# Patient Record
Sex: MALE | Race: WHITE | NOT HISPANIC OR LATINO | Employment: OTHER | URBAN - METROPOLITAN AREA
[De-identification: names, ages, dates, MRNs, and addresses within clinical notes are randomized per-mention and may not be internally consistent; named-entity substitution may affect disease eponyms.]

---

## 2023-11-16 ENCOUNTER — MEDICAL CORRESPONDENCE (OUTPATIENT)
Dept: HEALTH INFORMATION MANAGEMENT | Facility: CLINIC | Age: 58
End: 2023-11-16

## 2023-11-16 ENCOUNTER — TRANSFERRED RECORDS (OUTPATIENT)
Dept: HEALTH INFORMATION MANAGEMENT | Facility: CLINIC | Age: 58
End: 2023-11-16

## 2023-11-27 ENCOUNTER — MEDICAL CORRESPONDENCE (OUTPATIENT)
Dept: HEALTH INFORMATION MANAGEMENT | Facility: CLINIC | Age: 58
End: 2023-11-27

## 2023-11-27 ENCOUNTER — TRANSCRIBE ORDERS (OUTPATIENT)
Dept: OTHER | Age: 58
End: 2023-11-27

## 2023-11-27 DIAGNOSIS — H46.9 OPTIC NEUROPATHY, LEFT: Primary | ICD-10-CM

## 2023-12-21 DIAGNOSIS — H47.012 ISCHEMIC OPTIC NEUROPATHY OF LEFT EYE: Primary | ICD-10-CM

## 2023-12-22 ENCOUNTER — OFFICE VISIT (OUTPATIENT)
Dept: OPHTHALMOLOGY | Facility: CLINIC | Age: 58
End: 2023-12-22
Attending: STUDENT IN AN ORGANIZED HEALTH CARE EDUCATION/TRAINING PROGRAM
Payer: COMMERCIAL

## 2023-12-22 ENCOUNTER — LAB (OUTPATIENT)
Dept: LAB | Facility: CLINIC | Age: 58
End: 2023-12-22
Payer: COMMERCIAL

## 2023-12-22 DIAGNOSIS — H46.9 OPTIC NEUROPATHY, LEFT: Primary | ICD-10-CM

## 2023-12-22 DIAGNOSIS — H47.012 ISCHEMIC OPTIC NEUROPATHY OF LEFT EYE: ICD-10-CM

## 2023-12-22 DIAGNOSIS — H46.9 OPTIC NEUROPATHY, LEFT: ICD-10-CM

## 2023-12-22 PROCEDURE — 36415 COLL VENOUS BLD VENIPUNCTURE: CPT | Performed by: PATHOLOGY

## 2023-12-22 PROCEDURE — 86363 MOG-IGG1 ANTB FLO CYTMTRY EA: CPT | Mod: 90 | Performed by: PATHOLOGY

## 2023-12-22 PROCEDURE — 86038 ANTINUCLEAR ANTIBODIES: CPT | Performed by: STUDENT IN AN ORGANIZED HEALTH CARE EDUCATION/TRAINING PROGRAM

## 2023-12-22 PROCEDURE — 99205 OFFICE O/P NEW HI 60 MIN: CPT | Performed by: STUDENT IN AN ORGANIZED HEALTH CARE EDUCATION/TRAINING PROGRAM

## 2023-12-22 PROCEDURE — 86481 TB AG RESPONSE T-CELL SUSP: CPT | Performed by: STUDENT IN AN ORGANIZED HEALTH CARE EDUCATION/TRAINING PROGRAM

## 2023-12-22 PROCEDURE — 92083 EXTENDED VISUAL FIELD XM: CPT | Performed by: STUDENT IN AN ORGANIZED HEALTH CARE EDUCATION/TRAINING PROGRAM

## 2023-12-22 PROCEDURE — G0463 HOSPITAL OUTPT CLINIC VISIT: HCPCS | Performed by: STUDENT IN AN ORGANIZED HEALTH CARE EDUCATION/TRAINING PROGRAM

## 2023-12-22 PROCEDURE — 92133 CPTRZD OPH DX IMG PST SGM ON: CPT | Performed by: STUDENT IN AN ORGANIZED HEALTH CARE EDUCATION/TRAINING PROGRAM

## 2023-12-22 PROCEDURE — 86618 LYME DISEASE ANTIBODY: CPT | Performed by: STUDENT IN AN ORGANIZED HEALTH CARE EDUCATION/TRAINING PROGRAM

## 2023-12-22 PROCEDURE — 86037 ANCA TITER EACH ANTIBODY: CPT | Performed by: STUDENT IN AN ORGANIZED HEALTH CARE EDUCATION/TRAINING PROGRAM

## 2023-12-22 PROCEDURE — 92015 DETERMINE REFRACTIVE STATE: CPT

## 2023-12-22 PROCEDURE — 99000 SPECIMEN HANDLING OFFICE-LAB: CPT | Performed by: PATHOLOGY

## 2023-12-22 PROCEDURE — 86036 ANCA SCREEN EACH ANTIBODY: CPT | Performed by: STUDENT IN AN ORGANIZED HEALTH CARE EDUCATION/TRAINING PROGRAM

## 2023-12-22 PROCEDURE — 82164 ANGIOTENSIN I ENZYME TEST: CPT | Mod: 90 | Performed by: PATHOLOGY

## 2023-12-22 PROCEDURE — 86780 TREPONEMA PALLIDUM: CPT | Performed by: STUDENT IN AN ORGANIZED HEALTH CARE EDUCATION/TRAINING PROGRAM

## 2023-12-22 PROCEDURE — 86053 AQAPRN-4 ANTB FLO CYTMTRY EA: CPT | Mod: 90 | Performed by: PATHOLOGY

## 2023-12-22 RX ORDER — ROSUVASTATIN CALCIUM 10 MG/1
1 TABLET, COATED ORAL DAILY
COMMUNITY
Start: 2023-11-14

## 2023-12-22 RX ORDER — LOSARTAN POTASSIUM 25 MG/1
1 TABLET ORAL DAILY
COMMUNITY
Start: 2023-11-10

## 2023-12-22 ASSESSMENT — CONF VISUAL FIELD
OD_INFERIOR_TEMPORAL_RESTRICTION: 0
OD_SUPERIOR_NASAL_RESTRICTION: 0
OS_INFERIOR_NASAL_RESTRICTION: 0
METHOD: COUNTING FINGERS
OD_NORMAL: 1
OS_SUPERIOR_NASAL_RESTRICTION: 0
OS_INFERIOR_TEMPORAL_RESTRICTION: 0
OD_SUPERIOR_TEMPORAL_RESTRICTION: 0
OD_INFERIOR_NASAL_RESTRICTION: 0
OS_NORMAL: 1
OS_SUPERIOR_TEMPORAL_RESTRICTION: 0

## 2023-12-22 ASSESSMENT — REFRACTION_WEARINGRX
OD_CYLINDER: SPHERE
SPECS_TYPE: OTC READERS
OS_CYLINDER: SPHERE
OS_SPHERE: +3.50
OD_SPHERE: +3.50

## 2023-12-22 ASSESSMENT — TONOMETRY
IOP_METHOD: ICARE
OS_IOP_MMHG: 18
OD_IOP_MMHG: 19

## 2023-12-22 ASSESSMENT — SLIT LAMP EXAM - LIDS
COMMENTS: NORMAL
COMMENTS: NORMAL

## 2023-12-22 ASSESSMENT — CUP TO DISC RATIO
OS_RATIO: 0.5
OD_RATIO: 0.34

## 2023-12-22 ASSESSMENT — REFRACTION_MANIFEST
OS_CYLINDER: SPHERE
OD_ADD: +2.50
OD_SPHERE: +2.75
OS_ADD: +2.50
OS_SPHERE: +2.00
OD_CYLINDER: SPHERE

## 2023-12-22 ASSESSMENT — VISUAL ACUITY
METHOD: SNELLEN - LINEAR
OS_PH_CC+: +1
CORRECTION_TYPE: GLASSES
OD_CC: 20/25
OS_PH_CC: 20/60
OS_CC: 20/150
METHOD_MR: PT REQUESTING REFRACTION

## 2023-12-22 ASSESSMENT — EXTERNAL EXAM - RIGHT EYE: OD_EXAM: NORMAL

## 2023-12-22 ASSESSMENT — EXTERNAL EXAM - LEFT EYE: OS_EXAM: NORMAL

## 2023-12-22 NOTE — LETTER
2023       RE: Valdo Guadalupe  [facility] [address]     Dear Colleague,    Thank you for referring your patient, Valdo Guadalupe, to the [facility] [place] at [facility]. Please see a copy of my visit note below.    Neuro-Ophthalmology New Patient Visit  Dec 22, 2023         Patient was sent for consultation by Dr. Hatfield for optic neuropathy OS     HPI:    Patient noticed sudden onset left eye blurry vision on 10/26/23. He denies any eye pain, double vision, headaches, jaw claudication, fevers/night sweats, unintentional weight loss, extremity weakness. Patient went to ER on 10/28/23 after the start of the episode due to concern for stroke or brain tumor. Patient had CT head, MRA head/neck, MRI Brain came back normal. Patient then had saw Dr. Morton at [facility] then sent patient back to ER for further testing on 2023. He had ESR and CRP obtained which came back negative. He then was referred to Dr. Hatfield who performed FA which showed no slowed choroidal perfusion and swollen optic nerve OS. There was a noted white spot near the optic nerve OS. Patient was then referred here for further evaluation. Per patient after the initial 2-3 days after symptoms started it hasn't gotten any worse and actually has noticed blurry vision has improved.     Patient has HTN, HLD, borderline DMT2    Review of outside clinical notes/testin/16/23: Visit with Dr. Hatfield    10/28/23: MRI Brain WO:  IMPRESSION:   1. No acute infarction, mass effect, or intracranial hemorrhage.   2. Minimal chronic microvascular ischemic changes.     10/28/23: MRA Head/Neck:  Impression:  Unremarkable MRA of the head and neck.                 My interpretation performed today of outside testing:  Will attempt to obtain images of above MRI scans.     Past medical history:    Patient Active Problem List   Diagnosis     CARDIOVASCULAR SCREENING;  LDL GOAL LESS THAN 160       Medications:   Patient has a current medication list which includes the following prescription(s): losartan and rosuvastatin.     Family history / social history:  Patient's family history includes Diabetes in his father; Family History Negative in his brother, mother, and sister; Psychotic Disorder in his brother.    Patient  reports that he has been smoking. He has been smoking an average of 0.5 packs per day. He does not have any smokeless tobacco history on file. He reports current alcohol use. He reports that he does not use drugs.    Exam:    Base Eye Exam       Visual Acuity (Snellen - Linear)         Right Left    Dist cc 20/25 20/150    Dist ph cc  20/60 +1      Correction: Glasses              Tonometry ([facility], 2:35 PM)         Right Left    Pressure 19 18              Pupils         APD    Right None    Left Yes              Visual Fields (Counting fingers)         Left Right     Full Full              Extraocular Movement         Right Left     Full Full              Neuro/Psych       Oriented x3: Yes    Mood/Affect: Normal              Dilation       Both eyes: 1.0% Mydriacyl, 2.5% Rock Synephrine @ 3:33 PM                  Additional Tests       Color         Right Left    Ishihara 11/11 0/11                  Slit Lamp and Fundus Exam       External Exam         Right Left    External Normal Normal              Slit Lamp Exam         Right Left    Lids/Lashes Normal Normal    Conjunctiva/Sclera White and quiet White and quiet    Cornea Clear Clear    Anterior Chamber Deep and quiet Deep and quiet    Iris Round and reactive Round and reactive    Lens Clear Clear    Anterior Vitreous Normal Normal              Fundus Exam         Right Left    Disc Normal temporal pallor    C/D Ratio 0.34 0.5    Macula Normal Normal    Vessels Normal tortuous    Periphery Normal Normal                  Refraction       Wearing Rx         Sphere Cylinder    Right +3.50 Sphere    Left +3.50  Sphere      Type: OTC readers              Manifest Refraction         Sphere Cylinder Dist VA Add Near VA    Right +2.75 Sphere 20/20 +2.50 20/20    Left +2.00 Sphere 20/60-1 +2.50 20/30   Pt requesting refraction             Final Rx         Sphere Cylinder Dist VA Add Near VA    Right +2.75 Sphere 20/20 +2.50 20/20    Left +2.00 Sphere 20/60-1 +2.50 20/30      Expiration Date: 12/22/23                     Tests ordered and interpreted today:     Low Vision Central OU          Performed by: Jo   . Patient cooperation: Reliable   .   Good fix. Not dilated.     Right Eye  Reliability of the test: Good   . Findings: Nonspecific defect   . Interpretation: Normal   . Plan: Monitor   . Interval: Initial   .     Left Eye  Reliability of the test: Good   . Findings: Central scotoma, Bitemporal defect   . Interpretation: Abnormal, Optic neuropathy   . Plan: Monitor   . Interval: Initial   .          OCT Optic Nerve RNFL Spectralis OU (both eyes)          Performed by: KATIE   .     Right Eye  Reliability of the test: Good   . Interpretation: Normal   . Plan: Monitor   . Interval: Initial   .     Left Eye  Reliability of the test: Good   . Interpretation: Temporal loss   . Plan: Monitor   . Interval: Initial   .              Independent historians:  Patient     Discussion of management/interpretation with another provider:   None   Assessment:    Valdo Guadalupe is a pleasant 58 year old male with the following diagnoses:     Optic neuropathy OS     Patient with sudden onset painless blurry vision OS on 10/26/23 without disc edema, normal MRI Brain WO and normal MRA Head/Neck, normal ESR/CRP and no signs of delayed choroidal perfusion on FA obtained by Dr. Hatfield. Today on exam there is severe decrease of color vision  and temporal optic nerve pallor OS only with RNFL thinning, diffuse severe GCL loss and central/temporal field defect. Unable to review previously obtained MRI images today they were also obtained without  contrast. Patient states vision is subjectively improving since initial vision loss but still has significant decrease in visual acuity. I will attempt to obtain an MRI Orbit WWO contrast to rule out any inflammatory or infiltrative process or compressive problem. Will also obtain further infectious and inflammatory labs.     Refractive Error   New glasses rx given today        Plan:  Obtain MRI Orbit WWO  Inflammatory and infection lab work ordered     Follow-up: 3 weeks or sooner PRN. We discussed return precautions and modes of contacting our service for any changing symptoms or other questions/concerns.              The patient is presenting with an acute illness that potentially poses a threat to life or bodily function (vision).        Attending Physician Attestation:  Complete documentation of historical and exam elements from today's encounter can be found in the full encounter summary report (not reduplicated in this progress note).  I personally obtained the chief complaint(s) and history of present illness.  I confirmed and edited as necessary the review of systems, past medical/surgical history, family history, social history, and examination findings as documented by others; and I examined the patient myself. I personally reviewed the relevant tests, images, and reports as documented above.  I formulated and edited as necessary the assessment and plan and discussed the findings and management plan with the patient and family. - Alexander Cedeno MD       Again, thank you for allowing me to participate in the care of your patient.      Sincerely,    Alexander Cedeno MD

## 2023-12-22 NOTE — PROGRESS NOTES
Neuro-Ophthalmology New Patient Visit  Dec 22, 2023         Patient was sent for consultation by Dr. Hatfield for optic neuropathy OS     HPI:    Patient noticed sudden onset left eye blurry vision on 10/26/23. He denies any eye pain, double vision, headaches, jaw claudication, fevers/night sweats, unintentional weight loss, extremity weakness. Patient went to ER on 10/28/23 after the start of the episode due to concern for stroke or brain tumor. Patient had CT head, MRA head/neck, MRI Brain came back normal. Patient then had saw Dr. Morton at [facility] then sent patient back to ER for further testing on 2023. He had ESR and CRP obtained which came back negative. He then was referred to Dr. Hatfield who performed FA which showed no slowed choroidal perfusion and swollen optic nerve OS. There was a noted white spot near the optic nerve OS. Patient was then referred here for further evaluation. Per patient after the initial 2-3 days after symptoms started it hasn't gotten any worse and actually has noticed blurry vision has improved.     Patient has HTN, HLD, borderline DMT2    Review of outside clinical notes/testin/16/23: Visit with Dr. Hatfield    10/28/23: MRI Brain WO:  IMPRESSION:   1. No acute infarction, mass effect, or intracranial hemorrhage.   2. Minimal chronic microvascular ischemic changes.     10/28/23: MRA Head/Neck:  Impression:  Unremarkable MRA of the head and neck.                 My interpretation performed today of outside testing:  I have reviewed MRI Brain WO on 10/28/23. Due to imaging being performed without contrast unable to fully assess for inflammation or tumor of the optic nerve. No visible compressive entity upon review of MRI. I have also reviewed inflammatory lab results obtained by referring provider.     Past medical history:    Patient Active Problem List   Diagnosis    CARDIOVASCULAR SCREENING; LDL GOAL LESS THAN 160       Medications:   Patient has a current  medication list which includes the following prescription(s): losartan and rosuvastatin.     Family history / social history:  Patient's family history includes Diabetes in his father; Family History Negative in his brother, mother, and sister; Psychotic Disorder in his brother.    Patient  reports that he has been smoking. He has been smoking an average of 0.5 packs per day. He does not have any smokeless tobacco history on file. He reports current alcohol use. He reports that he does not use drugs.    Exam:    Base Eye Exam       Visual Acuity (Snellen - Linear)         Right Left    Dist cc 20/25 20/150    Dist ph cc  20/60 +1      Correction: Glasses              Tonometry ([facility], 2:35 PM)         Right Left    Pressure 19 18              Pupils         APD    Right None    Left Yes              Visual Fields (Counting fingers)         Left Right     Full Full              Extraocular Movement         Right Left     Full Full              Neuro/Psych       Oriented x3: Yes    Mood/Affect: Normal              Dilation       Both eyes: 1.0% Mydriacyl, 2.5% Rock Synephrine @ 3:33 PM                  Additional Tests       Color         Right Left    Ishihara 11/11 0/11                  Slit Lamp and Fundus Exam       External Exam         Right Left    External Normal Normal              Slit Lamp Exam         Right Left    Lids/Lashes Normal Normal    Conjunctiva/Sclera White and quiet White and quiet    Cornea Clear Clear    Anterior Chamber Deep and quiet Deep and quiet    Iris Round and reactive Round and reactive    Lens Clear Clear    Anterior Vitreous Normal Normal              Fundus Exam         Right Left    Disc Normal temporal pallor    C/D Ratio 0.34 0.5    Macula Normal Normal    Vessels Normal tortuous    Periphery Normal Normal                  Refraction       Wearing Rx         Sphere Cylinder    Right +3.50 Sphere    Left +3.50 Sphere      Type: OTC readers              Manifest Refraction          Sphere Cylinder Dist VA Add Near VA    Right +2.75 Sphere 20/20 +2.50 20/20    Left +2.00 Sphere 20/60-1 +2.50 20/30   Pt requesting refraction             Final Rx         Sphere Cylinder Dist VA Add Near VA    Right +2.75 Sphere 20/20 +2.50 20/20    Left +2.00 Sphere 20/60-1 +2.50 20/30      Expiration Date: 12/22/23                     Tests ordered and interpreted today:     Low Vision Central OU          Performed by: Jo   . Patient cooperation: Reliable   .   Good fix. Not dilated.     Right Eye  Reliability of the test: Good   . Findings: Nonspecific defect   . Interpretation: Normal   . Plan: Monitor   . Interval: Initial   .     Left Eye  Reliability of the test: Good   . Findings: Central scotoma, Bitemporal defect   . Interpretation: Abnormal, Optic neuropathy   . Plan: Monitor   . Interval: Initial   .          OCT Optic Nerve RNFL Spectralis OU (both eyes)          Performed by: KATIE   .     Right Eye  Reliability of the test: Good   . Interpretation: Normal   . Plan: Monitor   . Interval: Initial   .     Left Eye  Reliability of the test: Good   . Interpretation: Temporal loss   . Plan: Monitor   . Interval: Initial   .              Independent historians:  Patient     Discussion of management/interpretation with another provider:   None   Assessment:    Valdo Guadalupe is a pleasant 58 year old male with the following diagnoses:     Optic neuropathy OS     Patient with sudden onset painless blurry vision OS on 10/26/23 without disc edema, normal MRI Brain WO and normal MRA Head/Neck, normal ESR/CRP and no signs of delayed choroidal perfusion on FA obtained by Dr. Hatfield. Today on exam there is severe decrease of color vision  and temporal optic nerve pallor OS only with RNFL thinning, diffuse severe GCL loss and central/temporal field defect. Unable to review previously obtained MRI images today they were also obtained without contrast. Patient states vision is subjectively improving since initial  vision loss but still has significant decrease in visual acuity. I will attempt to obtain an MRI Orbit WWO contrast to rule out any inflammatory or infiltrative process or compressive problem. Will also obtain further infectious and inflammatory labs.     Refractive Error   New glasses rx given today        Plan:  Obtain MRI Orbit WWO  Inflammatory and infection lab work ordered     Follow-up: 3 weeks or sooner PRN. We discussed return precautions and modes of contacting our service for any changing symptoms or other questions/concerns.              The patient is presenting with an acute illness that potentially poses a threat to life or bodily function (vision).        Attending Physician Attestation:  Complete documentation of historical and exam elements from today's encounter can be found in the full encounter summary report (not reduplicated in this progress note).  I personally obtained the chief complaint(s) and history of present illness.  I confirmed and edited as necessary the review of systems, past medical/surgical history, family history, social history, and examination findings as documented by others; and I examined the patient myself. I personally reviewed the relevant tests, images, and reports as documented above.  I formulated and edited as necessary the assessment and plan and discussed the findings and management plan with the patient and family. - Alexander Cedeno MD

## 2023-12-22 NOTE — NURSING NOTE
"Chief Complaint(s) and History of Present Illness(es)       Optic Atrophy Evaluation    In left eye.  Since onset it is gradually improving.  Associated symptoms include Negative for double vision, eye pain and headache.  Pain was noted as 0/10. Additional comments: Valdo Guadalupe is a 58 year old male sent for consultation by Dr. Hatfield for optic neuropathy, left eye.    Valdo reports the left eye vision loss happened overnight.  Woke up one morning and left eye vision appeared \"shadowy.\"  He thinks the vision in the left eye has slightly improved.  No eye pain.  No vision concerns in the right eye.  He has been wearing his OTC +3.50 cheaters daily.  Have had multiple lab work and imaging without any conclusive answer.   DAYLIN Farias 12/22/2023 2:21 PM                   "

## 2023-12-23 LAB — T PALLIDUM AB SER QL: NONREACTIVE

## 2023-12-24 LAB
ACE SERPL-CCNC: 23 U/L
GAMMA INTERFERON BACKGROUND BLD IA-ACNC: 0.02 IU/ML
M TB IFN-G BLD-IMP: NEGATIVE
M TB IFN-G CD4+ BCKGRND COR BLD-ACNC: 9.98 IU/ML
MITOGEN IGNF BCKGRD COR BLD-ACNC: 0 IU/ML
MITOGEN IGNF BCKGRD COR BLD-ACNC: 0 IU/ML
QUANTIFERON MITOGEN: 10 IU/ML
QUANTIFERON NIL TUBE: 0.02 IU/ML
QUANTIFERON TB1 TUBE: 0.02 IU/ML
QUANTIFERON TB2 TUBE: 0.02

## 2023-12-26 LAB
ANA SER QL IF: NEGATIVE
ANCA AB PATTERN SER IF-IMP: NORMAL
AQP4 H2O CHANNEL IGG SERPL QL: NEGATIVE
B BURGDOR IGG+IGM SER QL: 0.03
C-ANCA TITR SER IF: NORMAL {TITER}
CNS DEMYELINATING DISEASE INTERPRETATION, SERUM: NORMAL
MOG FACS, S: NEGATIVE

## 2023-12-29 ENCOUNTER — TELEPHONE (OUTPATIENT)
Dept: OPHTHALMOLOGY | Facility: CLINIC | Age: 58
End: 2023-12-29
Payer: COMMERCIAL

## 2023-12-29 NOTE — TELEPHONE ENCOUNTER
Called patient. No answer, left message. Reminded patient of MRI ordered by Dr. Whitaker at last visit. Left imaging number to schedule per self. Left office number for assistance in scheduling.     Sona Held on 12/29/2023 at 2:36 PM

## 2024-01-04 ENCOUNTER — HOSPITAL ENCOUNTER (OUTPATIENT)
Dept: MRI IMAGING | Facility: HOSPITAL | Age: 59
Discharge: HOME | End: 2024-01-04
Attending: STUDENT IN AN ORGANIZED HEALTH CARE EDUCATION/TRAINING PROGRAM | Admitting: STUDENT IN AN ORGANIZED HEALTH CARE EDUCATION/TRAINING PROGRAM
Payer: COMMERCIAL

## 2024-01-04 DIAGNOSIS — H46.9 OPTIC NEUROPATHY, LEFT: ICD-10-CM

## 2024-01-04 PROCEDURE — 70553 MRI BRAIN STEM W/O & W/DYE: CPT

## 2024-01-04 PROCEDURE — 70543 MRI ORBT/FAC/NCK W/O &W/DYE: CPT

## 2024-01-04 PROCEDURE — A9585 GADOBUTROL INJECTION: HCPCS | Performed by: STUDENT IN AN ORGANIZED HEALTH CARE EDUCATION/TRAINING PROGRAM

## 2024-01-04 PROCEDURE — 255N000002 HC RX 255 OP 636: Performed by: STUDENT IN AN ORGANIZED HEALTH CARE EDUCATION/TRAINING PROGRAM

## 2024-01-04 RX ORDER — GADOBUTROL 604.72 MG/ML
10 INJECTION INTRAVENOUS ONCE
Status: COMPLETED | OUTPATIENT
Start: 2024-01-04 | End: 2024-01-04

## 2024-01-04 RX ADMIN — GADOBUTROL 8 ML: 604.72 INJECTION INTRAVENOUS at 11:43

## 2024-01-10 ENCOUNTER — TELEPHONE (OUTPATIENT)
Dept: OPHTHALMOLOGY | Facility: CLINIC | Age: 59
End: 2024-01-10
Payer: COMMERCIAL

## 2024-01-10 DIAGNOSIS — H46.9 OPTIC NEUROPATHY, LEFT: Primary | ICD-10-CM

## 2024-01-11 NOTE — TELEPHONE ENCOUNTER
Called patient about lab results and MRI results which were negative for a cause of vision loss. Recommended patient keep follow-up appointment on Friday. He says that he's continued to have improvement in vision since last visit. Discussed we will recheck vision at his appointment to see if there has been any improvement.     Alexander Ordaz MD   Fellow, Neuro-Ophthalmology

## 2024-01-12 ENCOUNTER — OFFICE VISIT (OUTPATIENT)
Dept: OPHTHALMOLOGY | Facility: CLINIC | Age: 59
End: 2024-01-12
Attending: STUDENT IN AN ORGANIZED HEALTH CARE EDUCATION/TRAINING PROGRAM
Payer: COMMERCIAL

## 2024-01-12 DIAGNOSIS — H46.9 OPTIC NEUROPATHY, LEFT: ICD-10-CM

## 2024-01-12 PROCEDURE — 92133 CPTRZD OPH DX IMG PST SGM ON: CPT | Performed by: STUDENT IN AN ORGANIZED HEALTH CARE EDUCATION/TRAINING PROGRAM

## 2024-01-12 PROCEDURE — G0463 HOSPITAL OUTPT CLINIC VISIT: HCPCS | Performed by: STUDENT IN AN ORGANIZED HEALTH CARE EDUCATION/TRAINING PROGRAM

## 2024-01-12 PROCEDURE — 92083 EXTENDED VISUAL FIELD XM: CPT | Performed by: STUDENT IN AN ORGANIZED HEALTH CARE EDUCATION/TRAINING PROGRAM

## 2024-01-12 PROCEDURE — 99214 OFFICE O/P EST MOD 30 MIN: CPT | Mod: GC | Performed by: STUDENT IN AN ORGANIZED HEALTH CARE EDUCATION/TRAINING PROGRAM

## 2024-01-12 RX ORDER — LOSARTAN POTASSIUM 50 MG/1
TABLET ORAL
COMMUNITY
Start: 2024-01-10

## 2024-01-12 ASSESSMENT — CONF VISUAL FIELD
OS_INFERIOR_TEMPORAL_RESTRICTION: 0
OS_NORMAL: 1
OD_SUPERIOR_TEMPORAL_RESTRICTION: 0
OS_SUPERIOR_NASAL_RESTRICTION: 0
OD_NORMAL: 1
OD_INFERIOR_NASAL_RESTRICTION: 0
OS_INFERIOR_NASAL_RESTRICTION: 0
OD_SUPERIOR_NASAL_RESTRICTION: 0
OS_SUPERIOR_TEMPORAL_RESTRICTION: 0
OD_INFERIOR_TEMPORAL_RESTRICTION: 0
METHOD: COUNTING FINGERS

## 2024-01-12 ASSESSMENT — EXTERNAL EXAM - RIGHT EYE: OD_EXAM: NORMAL

## 2024-01-12 ASSESSMENT — SLIT LAMP EXAM - LIDS
COMMENTS: NORMAL
COMMENTS: NORMAL

## 2024-01-12 ASSESSMENT — CUP TO DISC RATIO
OD_RATIO: 0.34
OS_RATIO: 0.5

## 2024-01-12 ASSESSMENT — VISUAL ACUITY
METHOD: SNELLEN - LINEAR IN TF
OS_CC: 20/40
CORRECTION_TYPE: GLASSES
OD_CC: 20/25
OD_CC+: -3

## 2024-01-12 ASSESSMENT — TONOMETRY
IOP_METHOD: ICARE
OD_IOP_MMHG: 19
OS_IOP_MMHG: 19

## 2024-01-12 ASSESSMENT — EXTERNAL EXAM - LEFT EYE: OS_EXAM: NORMAL

## 2024-01-12 NOTE — PROGRESS NOTES
Neuro-Ophthalmology Follow-up Patient Visit  Jan 12, 2024         Patient was sent for consultation by Dr. Hatfield for optic neuropathy OS     Interval Hx since last visit:   Patient had repeat MRI Brain/orbit results listed below and inflammatory/infectious work up results posted below that all came back negative. Today patient states that since his initial episode of waking up with vision loss in the left eye, his vision has been stable and possible small improvement. He has not had recurrence of any symptoms/vision loss.       Exam:    Base Eye Exam       Visual Acuity (Snellen - Linear in TF)         Right Left    Dist cc 20/25 -3 20/40      Correction: Glasses   Trial Frames from MRx 12/22/2023:     Right +2.75 Sphere  Left +2.00 Sphere                  Tonometry ([facility], 10:32 AM)         Right Left    Pressure 19 19              Pupils         Pupils APD    Right PERRL Yes    Left PERRL Trace              Visual Fields (Counting fingers)         Left Right     Full Full              Extraocular Movement         Right Left     Full Full              Neuro/Psych       Oriented x3: Yes    Mood/Affect: Normal              Dilation       Both eyes: 1.0% Mydriacyl, 2.5% Rock Synephrine @ 10:41 AM                  Additional Tests       Color         Right Left    Ishihara 11/11 1/11                  Slit Lamp and Fundus Exam       External Exam         Right Left    External Normal Normal              Slit Lamp Exam         Right Left    Lids/Lashes Normal Normal    Conjunctiva/Sclera White and quiet White and quiet    Cornea Clear Clear    Anterior Chamber Deep and quiet Deep and quiet    Iris Round and reactive Round and reactive    Lens Clear Clear    Anterior Vitreous Normal Normal              Fundus Exam         Right Left    Disc Normal temporal pallor    C/D Ratio 0.34 0.5    Macula Normal Normal    Vessels Normal tortuous    Periphery Normal Normal                     Tests ordered and interpreted today:      Glaucoma Top OU          Performed by: Jo   . Patient cooperation: Reliable   .   Good fix. Dilated after VF.     Right Eye  Reliability of the test: Good   . Findings: Visual fields normal   . Interpretation: Normal   . Plan: Monitor   . Interval: Same   .     Left Eye  Reliability of the test: Good   . Findings: Visual fields normal, Nonspecific defect   . Interpretation: Normal   . Plan: Monitor   . Interval: Same   .          OCT Optic Nerve RNFL Spectralis OU (both eyes)          Performed by: TLT   .     Right Eye  Reliability of the test: Good   . Test Findings: Normal   . Interpretation: Normal   . Plan: Monitor   . Interval: Same   .     Left Eye  Reliability of the test: Good   . Test Findings: Abnormal   . Interpretation: Temporal loss   . Plan: Monitor   . Interval: Worse   .           Glaucoma Top OU          Performed by: Jo   . Patient cooperation: Reliable   .   Good fix. Dilated after VF.     Right Eye  Reliability of the test: Good   . Findings: Visual fields normal   . Interpretation: Normal   . Plan: Monitor   . Interval: Same   .     Left Eye  Reliability of the test: Good   . Findings: Visual fields normal, Nonspecific defect   . Interpretation: Abnormal, Optic neuropathy   . Plan: Monitor   . Interval: Same   .          OCT Optic Nerve RNFL Spectralis OU (both eyes)          Performed by: TLT   .     Right Eye  Reliability of the test: Good   . Test Findings: Normal   . Interpretation: Normal   . Plan: Monitor   . Interval: Same   .     Left Eye  Reliability of the test: Good   . Test Findings: Abnormal   . Interpretation: Temporal loss   . Plan: Monitor   . Interval: Worse   .              Assessment:    Valdo Guadalupe is a pleasant 58 year old male with the following diagnoses:     Optic neuropathy OS     Patient with sudden onset painless blurry vision OS on 10/26/23 without disc edema, normal MRI Brain WO and normal MRA Head/Neck, normal ESR/CRP and no signs of delayed choroidal  perfusion on FA obtained by Dr. Hatfield. Today on exam there is severe decrease of color vision  and temporal optic nerve pallor OS only with RNFL thinning, diffuse severe GCL loss and central/temporal field defect. Repeated MRI orbit (original MRI was without contrast) and it was normal--no signs of optic neuritis/demyelination. Additional lab work-up for infectious/autoimmune/vitamin deficiency has been normal/negative. Today, his visual field testing, and ganglion cell layer is stable. He has trace worsening of OCT RNFL thinning, which is not unexpected. His visual acuity is improved from 20/60 to 20/40. No new symptoms.  At this moment in time, an occlusive arterial or venous occlusion has been ruled out, not typical demographic for GCA and ESR/CRP normal, no evidence of infectious or known cause of inflammatory optic neuritis on lab work-up,  no related history to suggest PION, and his cup/disc is quite large for NAION and there was no documented disc edema at time of vision loss. No carotid disease, and echocardiogram is reportedly normal (records not in EMR). Patient has undergone the appropriate work up for stroke.  It is unclear the exact etiology is of Mr. Guadalupe's vision loss, but history of sudden vision loss that has recovered some on it's own is suggestive of a optic neuritis type picture even though patient didn't report any pain during initial symptoms onset it does not rule out the possibility.      Refractive Error   New glasses rx given at last visit         Plan:  Continue to follow with PCP for medical optomization  Reviewed if symptoms were to occur again, immediately contact eye clinic or go to the emergency room for evaluation.     Follow-up: 3 months, OCT RNFL, G-top             30 minutes were spent on the date of service reviewing outside records and the chart, taking a history, examining the patient, counseling the patient, and otherwise managing the patient as documented  above.      Resident: Adrian Driver MD      Attending Physician Attestation:  Complete documentation of historical and exam elements from today's encounter can be found in the full encounter summary report (not reduplicated in this progress note).  A medical student was involved in the care of the patient. I was present with the medical student who participated in the service and in the documentation of the note. I have verified the history and personally performed the physical exam and medical decision making.  I personally reviewed the relevant tests, images, and reports as documented above.I personally reviewed the ophthalmic test(s) associated with this encounter, agree with the interpretation(s) as documented by the resident and have edited the corresponding report(s) as necessary. I formulated and edited as necessary the assessment and plan and discussed the findings and management plan with the patient and family.    - Alexander Ordaz MD

## 2024-01-12 NOTE — LETTER
1/12/2024       RE: Valdo Guadalupe  [address]  [facility] [id number]     Dear Colleague,    Thank you for referring your patient, Valdo Guadalupe, to the [facility] [place] at [facility]. Please see a copy of my visit note below.    Neuro-Ophthalmology Follow-up Patient Visit  Jan 12, 2024         Patient was sent for consultation by Dr. Hatfield for optic neuropathy OS     Interval Hx since last visit:   Patient had repeat MRI Brain/orbit results listed below and inflammatory/infectious work up results posted below that all came back negative. Today patient states that since his initial episode of waking up with vision loss in the left eye, his vision has been stable and possible small improvement. He has not had recurrence of any symptoms/vision loss.       Exam:    Base Eye Exam       Visual Acuity (Snellen - Linear in TF)         Right Left    Dist cc 20/25 -3 20/40      Correction: Glasses   Trial Frames from MRx 12/22/2023:     Right +2.75 Sphere  Left +2.00 Sphere                  Tonometry ([facility], 10:32 AM)         Right Left    Pressure 19 19              Pupils         Pupils APD    Right PERRL Yes    Left PERRL Trace              Visual Fields (Counting fingers)         Left Right     Full Full              Extraocular Movement         Right Left     Full Full              Neuro/Psych       Oriented x3: Yes    Mood/Affect: Normal              Dilation       Both eyes: 1.0% Mydriacyl, 2.5% Rock Synephrine @ 10:41 AM                  Additional Tests       Color         Right Left    Ishihara 11/11 1/11                  Slit Lamp and Fundus Exam       External Exam         Right Left    External Normal Normal              Slit Lamp Exam         Right Left    Lids/Lashes Normal Normal    Conjunctiva/Sclera White and quiet White and quiet    Cornea Clear Clear    Anterior Chamber Deep and quiet Deep and quiet    Iris Round and reactive  Round and reactive    Lens Clear Clear    Anterior Vitreous Normal Normal              Fundus Exam         Right Left    Disc Normal temporal pallor    C/D Ratio 0.34 0.5    Macula Normal Normal    Vessels Normal tortuous    Periphery Normal Normal                     Tests ordered and interpreted today:     Glaucoma Top OU          Performed by: Jo   . Patient cooperation: Reliable   .   Good fix. Dilated after VF.     Right Eye  Reliability of the test: Good   . Findings: Visual fields normal   . Interpretation: Normal   . Plan: Monitor   . Interval: Same   .     Left Eye  Reliability of the test: Good   . Findings: Visual fields normal, Nonspecific defect   . Interpretation: Normal   . Plan: Monitor   . Interval: Same   .          OCT Optic Nerve RNFL Spectralis OU (both eyes)          Performed by: TLT   .     Right Eye  Reliability of the test: Good   . Test Findings: Normal   . Interpretation: Normal   . Plan: Monitor   . Interval: Same   .     Left Eye  Reliability of the test: Good   . Test Findings: Abnormal   . Interpretation: Temporal loss   . Plan: Monitor   . Interval: Worse   .           Glaucoma Top OU          Performed by: Jo   . Patient cooperation: Reliable   .   Good fix. Dilated after VF.     Right Eye  Reliability of the test: Good   . Findings: Visual fields normal   . Interpretation: Normal   . Plan: Monitor   . Interval: Same   .     Left Eye  Reliability of the test: Good   . Findings: Visual fields normal, Nonspecific defect   . Interpretation: Abnormal, Optic neuropathy   . Plan: Monitor   . Interval: Same   .          OCT Optic Nerve RNFL Spectralis OU (both eyes)          Performed by: TLT   .     Right Eye  Reliability of the test: Good   . Test Findings: Normal   . Interpretation: Normal   . Plan: Monitor   . Interval: Same   .     Left Eye  Reliability of the test: Good   . Test Findings: Abnormal   . Interpretation: Temporal loss   . Plan: Monitor   . Interval: Worse   .               Assessment:    Valdo Guadalupe is a pleasant 58 year old male with the following diagnoses:     Optic neuropathy OS     Patient with sudden onset painless blurry vision OS on 10/26/23 without disc edema, normal MRI Brain WO and normal MRA Head/Neck, normal ESR/CRP and no signs of delayed choroidal perfusion on FA obtained by Dr. Hatfield. Today on exam there is severe decrease of color vision  and temporal optic nerve pallor OS only with RNFL thinning, diffuse severe GCL loss and central/temporal field defect. Repeated MRI orbit (original MRI was without contrast) and it was normal--no signs of optic neuritis/demyelination. Additional lab work-up for infectious/autoimmune/vitamin deficiency has been normal/negative. Today, his visual field testing, and ganglion cell layer is stable. He has trace worsening of OCT RNFL thinning, which is not unexpected. His visual acuity is improved from 20/60 to 20/40. No new symptoms.  At this moment in time, an occlusive arterial or venous occlusion has been ruled out, not typical demographic for GCA and ESR/CRP normal, no evidence of infectious or known cause of inflammatory optic neuritis on lab work-up,  no related history to suggest PION, and his cup/disc is quite large for NAION and there was no documented disc edema at time of vision loss. No carotid disease, and echocardiogram is reportedly normal (records not in EMR). Patient has undergone the appropriate work up for stroke.  It is unclear the exact etiology is of Mr. Guadalupe's vision loss, but history of sudden vision loss that has recovered some on it's own is suggestive of a optic neuritis type picture even though patient didn't report any pain during initial symptoms onset it does not rule out the possibility.      Refractive Error   New glasses rx given at last visit         Plan:  Continue to follow with PCP for medical optomization  Reviewed if symptoms were to occur again, immediately contact eye clinic or go  to the emergency room for evaluation.     Follow-up: 3 months, OCT RNFL, G-top             30 minutes were spent on the date of service reviewing outside records and the chart, taking a history, examining the patient, counseling the patient, and otherwise managing the patient as documented above.      Resident: Adrian Driver MD      Attending Physician Attestation:  Complete documentation of historical and exam elements from today's encounter can be found in the full encounter summary report (not reduplicated in this progress note).  A medical student was involved in the care of the patient. I was present with the medical student who participated in the service and in the documentation of the note. I have verified the history and personally performed the physical exam and medical decision making.  I personally reviewed the relevant tests, images, and reports as documented above.I personally reviewed the ophthalmic test(s) associated with this encounter, agree with the interpretation(s) as documented by the resident and have edited the corresponding report(s) as necessary. I formulated and edited as necessary the assessment and plan and discussed the findings and management plan with the patient and family.    - Alexander Ordaz MD         Again, thank you for allowing me to participate in the care of your patient.      Sincerely,    Alexander Ordaz MD

## 2025-03-02 ENCOUNTER — HEALTH MAINTENANCE LETTER (OUTPATIENT)
Age: 60
End: 2025-03-02